# Patient Record
Sex: MALE | Race: WHITE | NOT HISPANIC OR LATINO | Employment: FULL TIME | URBAN - METROPOLITAN AREA
[De-identification: names, ages, dates, MRNs, and addresses within clinical notes are randomized per-mention and may not be internally consistent; named-entity substitution may affect disease eponyms.]

---

## 2019-02-14 ENCOUNTER — HOSPITAL ENCOUNTER (EMERGENCY)
Facility: HOSPITAL | Age: 44
Discharge: HOME | End: 2019-02-14
Attending: EMERGENCY MEDICINE
Payer: COMMERCIAL

## 2019-02-14 VITALS
DIASTOLIC BLOOD PRESSURE: 80 MMHG | OXYGEN SATURATION: 99 % | SYSTOLIC BLOOD PRESSURE: 140 MMHG | HEART RATE: 85 BPM | RESPIRATION RATE: 20 BRPM | TEMPERATURE: 99 F

## 2019-02-14 DIAGNOSIS — M19.90 ARTHRITIS: Primary | ICD-10-CM

## 2019-02-14 DIAGNOSIS — M25.473 ANKLE SWELLING: ICD-10-CM

## 2019-02-14 LAB
ALBUMIN SERPL BCP-MCNC: 4.1 G/DL
ALP SERPL-CCNC: 84 U/L
ALT SERPL W/O P-5'-P-CCNC: 13 U/L
ANION GAP SERPL CALC-SCNC: 11 MMOL/L
AST SERPL-CCNC: 17 U/L
BASOPHILS # BLD AUTO: 0.08 K/UL
BASOPHILS NFR BLD: 0.5 %
BILIRUB SERPL-MCNC: 0.5 MG/DL
BUN SERPL-MCNC: 18 MG/DL
CALCIUM SERPL-MCNC: 9.5 MG/DL
CHLORIDE SERPL-SCNC: 105 MMOL/L
CO2 SERPL-SCNC: 21 MMOL/L
CREAT SERPL-MCNC: 1.1 MG/DL
CRP SERPL-MCNC: 5.7 MG/L
DIFFERENTIAL METHOD: ABNORMAL
EOSINOPHIL # BLD AUTO: 0.2 K/UL
EOSINOPHIL NFR BLD: 1.2 %
ERYTHROCYTE [DISTWIDTH] IN BLOOD BY AUTOMATED COUNT: 12.5 %
ERYTHROCYTE [SEDIMENTATION RATE] IN BLOOD BY WESTERGREN METHOD: 3 MM/HR
EST. GFR  (AFRICAN AMERICAN): >60 ML/MIN/1.73 M^2
EST. GFR  (NON AFRICAN AMERICAN): >60 ML/MIN/1.73 M^2
GLUCOSE SERPL-MCNC: 99 MG/DL
HCT VFR BLD AUTO: 44.3 %
HGB BLD-MCNC: 15 G/DL
IMM GRANULOCYTES # BLD AUTO: 0.06 K/UL
IMM GRANULOCYTES NFR BLD AUTO: 0.3 %
LYMPHOCYTES # BLD AUTO: 1.8 K/UL
LYMPHOCYTES NFR BLD: 10.2 %
MCH RBC QN AUTO: 30.7 PG
MCHC RBC AUTO-ENTMCNC: 33.9 G/DL
MCV RBC AUTO: 91 FL
MONOCYTES # BLD AUTO: 1.6 K/UL
MONOCYTES NFR BLD: 9.2 %
NEUTROPHILS # BLD AUTO: 13.7 K/UL
NEUTROPHILS NFR BLD: 78.6 %
NRBC BLD-RTO: 0 /100 WBC
PLATELET # BLD AUTO: 288 K/UL
PMV BLD AUTO: 11.2 FL
POTASSIUM SERPL-SCNC: 4.4 MMOL/L
PROT SERPL-MCNC: 7.5 G/DL
RBC # BLD AUTO: 4.88 M/UL
SODIUM SERPL-SCNC: 137 MMOL/L
WBC # BLD AUTO: 17.36 K/UL

## 2019-02-14 PROCEDURE — 25000003 PHARM REV CODE 250: Performed by: STUDENT IN AN ORGANIZED HEALTH CARE EDUCATION/TRAINING PROGRAM

## 2019-02-14 PROCEDURE — 85652 RBC SED RATE AUTOMATED: CPT

## 2019-02-14 PROCEDURE — 63600175 PHARM REV CODE 636 W HCPCS: Performed by: STUDENT IN AN ORGANIZED HEALTH CARE EDUCATION/TRAINING PROGRAM

## 2019-02-14 PROCEDURE — 80053 COMPREHEN METABOLIC PANEL: CPT

## 2019-02-14 PROCEDURE — 99284 EMERGENCY DEPT VISIT MOD MDM: CPT | Mod: ,,, | Performed by: EMERGENCY MEDICINE

## 2019-02-14 PROCEDURE — 99284 PR EMERGENCY DEPT VISIT,LEVEL IV: ICD-10-PCS | Mod: ,,, | Performed by: EMERGENCY MEDICINE

## 2019-02-14 PROCEDURE — 86140 C-REACTIVE PROTEIN: CPT

## 2019-02-14 PROCEDURE — 99284 EMERGENCY DEPT VISIT MOD MDM: CPT | Mod: 25

## 2019-02-14 PROCEDURE — 85025 COMPLETE CBC W/AUTO DIFF WBC: CPT

## 2019-02-14 RX ORDER — IBUPROFEN 400 MG/1
800 TABLET ORAL
Status: COMPLETED | OUTPATIENT
Start: 2019-02-14 | End: 2019-02-14

## 2019-02-14 RX ORDER — PREDNISONE 10 MG/1
10 TABLET ORAL DAILY
Qty: 21 TABLET | Refills: 0 | Status: SHIPPED | OUTPATIENT
Start: 2019-02-14 | End: 2019-02-24

## 2019-02-14 RX ORDER — IBUPROFEN 800 MG/1
800 TABLET ORAL EVERY 6 HOURS PRN
Qty: 30 TABLET | Refills: 0 | Status: SHIPPED | OUTPATIENT
Start: 2019-02-14

## 2019-02-14 RX ORDER — PREDNISONE 20 MG/1
40 TABLET ORAL
Status: COMPLETED | OUTPATIENT
Start: 2019-02-14 | End: 2019-02-14

## 2019-02-14 RX ADMIN — PREDNISONE 40 MG: 20 TABLET ORAL at 03:02

## 2019-02-14 RX ADMIN — IBUPROFEN 800 MG: 400 TABLET, FILM COATED ORAL at 03:02

## 2019-02-14 NOTE — ED NOTES
Pt arrives to ED via EMS with complaints of recurring right lower leg and foot pain. Pt states that he has had off and on episodes of bone spurs over the yrs due to playing rugby in his childhood. Pt states trying OTC anti-inflammatories with no decrease in pain.

## 2019-02-14 NOTE — ED PROVIDER NOTES
Encounter Date: 2/14/2019       History     Chief Complaint   Patient presents with    Foot Pain     Pt arrives via EMS for right foot pain from heel to calf. Pt reports old rugby injuries to foot. +2 pulses. No deformity noted.     HPI   Pt presents w/ c/o R ankle and foot pain x 2 days. Reports that he has had progressive swelling and pain to ankle over the day associated w/ shooting pains into his calf and intermittent paraesthesias w/ standing. Has had prior occurences of similar episodes in the past which respond to steroids.     Review of patient's allergies indicates:  No Known Allergies  History reviewed. No pertinent past medical history.  No past surgical history on file.  No family history on file.  Social History     Tobacco Use    Smoking status: Not on file   Substance Use Topics    Alcohol use: Not on file    Drug use: Not on file     Review of Systems   Constitutional: Negative for chills and fever.   HENT: Negative for dental problem and sore throat.    Respiratory: Negative for cough and shortness of breath.    Cardiovascular: Negative for chest pain.   Gastrointestinal: Negative for abdominal pain and nausea.   Genitourinary: Negative for dysuria and urgency.   Musculoskeletal: Positive for gait problem and joint swelling. Negative for back pain.   Skin: Negative for rash and wound.   Neurological: Negative for weakness and headaches.   Hematological: Does not bruise/bleed easily.   Psychiatric/Behavioral: Negative for agitation and confusion.       Physical Exam     Initial Vitals [02/14/19 0249]   BP Pulse Resp Temp SpO2   (!) 140/80 85 20 98.9 °F (37.2 °C) 99 %      MAP       --         Physical Exam    Nursing note and vitals reviewed.  Constitutional: He is not diaphoretic. No distress.   HENT:   Head: Normocephalic and atraumatic.   Eyes: EOM are normal. Pupils are equal, round, and reactive to light.   Neck: Normal range of motion. Neck supple.   Cardiovascular: Normal rate and regular  rhythm.   Pulmonary/Chest: Breath sounds normal. No respiratory distress. He has no wheezes.   Abdominal: Soft. He exhibits no distension.   Musculoskeletal: He exhibits tenderness.        Right ankle: He exhibits decreased range of motion and swelling. He exhibits no deformity and normal pulse. Tenderness. Lateral malleolus tenderness found. Achilles tendon exhibits abnormal Dwyer's test results.   Pt has TTP and swelling in the area posterior to lateral malleolus   Limited ROM 2/2 pain w/ flexion and extension  +Austin test on R ankle  No obvious deformity or wounds to extremity  Neurovascularly intact, +2 DP pulses   Neurological: He is alert and oriented to person, place, and time.   Skin: Skin is warm. Capillary refill takes less than 2 seconds. No rash noted.         ED Course   Procedures  Labs Reviewed   COMPREHENSIVE METABOLIC PANEL - Abnormal; Notable for the following components:       Result Value    CO2 21 (*)     All other components within normal limits   SEDIMENTATION RATE   C-REACTIVE PROTEIN   CBC W/ AUTO DIFFERENTIAL          Imaging Results          X-Ray Tibia Fibula 2 View Right (Final result)  Result time 02/14/19 03:55:07    Final result by Shawn Dutta MD (02/14/19 03:55:07)                 Impression:      No acute fracture.    Osteochondroma arises off the lateral aspect of the proximal right tibia.      Electronically signed by: Shawn Dutta MD  Date:    02/14/2019  Time:    03:55             Narrative:    EXAMINATION:  XR ANKLE COMPLETE 3 VIEW RIGHT; XR FOOT COMPLETE 3 VIEW RIGHT; XR TIBIA FIBULA 2 VIEW RIGHT    CLINICAL HISTORY:  Effusion, unspecified ankle    TECHNIQUE:  Right tibia and fibula two views, right ankle three views and right foot three views.    COMPARISON:  None    FINDINGS:  No fracture or dislocation.  Osteochondroma arises off the lateral aspect of the proximal right tibia abutting the proximal right fibula.  Ankle mortise is symmetric.  Lisfranc joints  are congruent.  Soft tissues are unremarkable.                               X-Ray Foot Complete Right (Final result)  Result time 02/14/19 03:55:07    Final result by Shawn Dutta MD (02/14/19 03:55:07)                 Impression:      No acute fracture.    Osteochondroma arises off the lateral aspect of the proximal right tibia.      Electronically signed by: Shawn Dutta MD  Date:    02/14/2019  Time:    03:55             Narrative:    EXAMINATION:  XR ANKLE COMPLETE 3 VIEW RIGHT; XR FOOT COMPLETE 3 VIEW RIGHT; XR TIBIA FIBULA 2 VIEW RIGHT    CLINICAL HISTORY:  Effusion, unspecified ankle    TECHNIQUE:  Right tibia and fibula two views, right ankle three views and right foot three views.    COMPARISON:  None    FINDINGS:  No fracture or dislocation.  Osteochondroma arises off the lateral aspect of the proximal right tibia abutting the proximal right fibula.  Ankle mortise is symmetric.  Lisfranc joints are congruent.  Soft tissues are unremarkable.                               X-Ray Ankle Complete Right (Final result)  Result time 02/14/19 03:55:07    Final result by Shawn Dutta MD (02/14/19 03:55:07)                 Impression:      No acute fracture.    Osteochondroma arises off the lateral aspect of the proximal right tibia.      Electronically signed by: Shawn Dutta MD  Date:    02/14/2019  Time:    03:55             Narrative:    EXAMINATION:  XR ANKLE COMPLETE 3 VIEW RIGHT; XR FOOT COMPLETE 3 VIEW RIGHT; XR TIBIA FIBULA 2 VIEW RIGHT    CLINICAL HISTORY:  Effusion, unspecified ankle    TECHNIQUE:  Right tibia and fibula two views, right ankle three views and right foot three views.    COMPARISON:  None    FINDINGS:  No fracture or dislocation.  Osteochondroma arises off the lateral aspect of the proximal right tibia abutting the proximal right fibula.  Ankle mortise is symmetric.  Lisfranc joints are congruent.  Soft tissues are unremarkable.                                                       Clinical Impression:   The primary encounter diagnosis was Arthritis. A diagnosis of Ankle swelling was also pertinent to this visit.    Resident MDM PGY-2  Pt presents w/ c/o pain and swelling to R lateral malleolus w/ similar episodes in the past  ddx includes osteoarthritis, septic arthritis, gout, occult injury  Pending XR studies of RLE as well as inflammatory markers  Given Motrin and prednisone 40mg for pain.  Will continue to monitor, dispo pending labs and imaging.    Anali Waterman MD MPH   [employer] [facility] PGY-2  3:57 AM 2/14/2019    Reevaluation PGY-1 4:56 AM 2/14/2019 -  Labs significant for CMP WNL, inflammatory markers WNL.WBCs 17. XRs w/ osteochondroma- chronic no acute evidence of injury. Suspect symptoms 2/2 arthritic flare. Pt afebrile w/o signs of erythema or concern for Septic joint. No need for joint tap at this time. Suspect leukocytosis 2/2 pain and inflammation. Will treat w/ steroid taper and NSAIDs and have pt follow up with primary care, return precautions to return to ED if signs of infection develop. Ambulated in ED. Strict return precautions given. Safe for discharge.      Disposition:   Disposition: Discharged  Condition: Stable                        Anali Waterman MD  Resident  02/14/19 [id number]